# Patient Record
Sex: FEMALE | Race: WHITE | NOT HISPANIC OR LATINO | Employment: OTHER | URBAN - METROPOLITAN AREA
[De-identification: names, ages, dates, MRNs, and addresses within clinical notes are randomized per-mention and may not be internally consistent; named-entity substitution may affect disease eponyms.]

---

## 2023-08-25 ENCOUNTER — TELEPHONE (OUTPATIENT)
Dept: INTERNAL MEDICINE | Facility: CLINIC | Age: 48
End: 2023-08-25
Payer: COMMERCIAL

## 2023-08-25 NOTE — TELEPHONE ENCOUNTER
----- Message from Tasha Castaneda sent at 8/25/2023  3:50 PM CDT -----  Regarding: np appt request  Name of Who is Calling:KOSTA SNYDER [[id number]]          What is the request in detail: pt would like to schedule a np appt for thyroid care           Can the clinic reply by [facility]: no           What Number to Call Back if not in [facility]: [phone] (home)

## 2023-09-01 ENCOUNTER — OFFICE VISIT (OUTPATIENT)
Dept: PRIMARY CARE CLINIC | Facility: CLINIC | Age: 48
End: 2023-09-01
Payer: COMMERCIAL

## 2023-09-01 VITALS
HEART RATE: 97 BPM | HEIGHT: 61 IN | WEIGHT: 123 LBS | TEMPERATURE: 98 F | DIASTOLIC BLOOD PRESSURE: 68 MMHG | BODY MASS INDEX: 23.22 KG/M2 | OXYGEN SATURATION: 98 % | SYSTOLIC BLOOD PRESSURE: 110 MMHG

## 2023-09-01 DIAGNOSIS — E06.3 HASHIMOTO'S THYROIDITIS: Primary | ICD-10-CM

## 2023-09-01 DIAGNOSIS — E78.2 MIXED HYPERLIPIDEMIA: ICD-10-CM

## 2023-09-01 DIAGNOSIS — F41.9 ANXIETY: ICD-10-CM

## 2023-09-01 DIAGNOSIS — F17.200 NEEDS SMOKING CESSATION EDUCATION: ICD-10-CM

## 2023-09-01 PROCEDURE — 99999 PR PBB SHADOW E&M-EST. PATIENT-LVL III: ICD-10-PCS | Mod: PBBFAC,,, | Performed by: FAMILY MEDICINE

## 2023-09-01 PROCEDURE — 99204 OFFICE O/P NEW MOD 45 MIN: CPT | Mod: S$GLB,,, | Performed by: FAMILY MEDICINE

## 2023-09-01 PROCEDURE — 99999 PR PBB SHADOW E&M-EST. PATIENT-LVL III: CPT | Mod: PBBFAC,,, | Performed by: FAMILY MEDICINE

## 2023-09-01 PROCEDURE — 99204 PR OFFICE/OUTPT VISIT, NEW, LEVL IV, 45-59 MIN: ICD-10-PCS | Mod: S$GLB,,, | Performed by: FAMILY MEDICINE

## 2023-09-01 RX ORDER — HYDROCODONE BITARTRATE AND ACETAMINOPHEN 10; 325 MG/1; MG/1
1 TABLET ORAL
COMMUNITY
Start: 2023-08-29 | End: 2023-10-31

## 2023-09-01 RX ORDER — ALPRAZOLAM 0.25 MG/1
0.25 TABLET ORAL
COMMUNITY
Start: 2023-04-12 | End: 2023-09-01 | Stop reason: SDUPTHER

## 2023-09-01 RX ORDER — LIOTHYRONINE SODIUM 5 UG/1
5 TABLET ORAL
COMMUNITY
Start: 2023-06-22 | End: 2023-09-14 | Stop reason: SDUPTHER

## 2023-09-01 RX ORDER — LIDOCAINE 50 MG/G
1 PATCH TOPICAL
COMMUNITY
Start: 2023-03-14

## 2023-09-01 RX ORDER — ALPRAZOLAM 0.25 MG/1
0.25 TABLET ORAL 3 TIMES DAILY PRN
Qty: 30 TABLET | Refills: 2 | Status: SHIPPED | OUTPATIENT
Start: 2023-09-01 | End: 2024-03-14

## 2023-09-01 RX ORDER — ONDANSETRON 8 MG/1
8 TABLET, ORALLY DISINTEGRATING ORAL
COMMUNITY

## 2023-09-01 RX ORDER — ATORVASTATIN CALCIUM 80 MG/1
80 TABLET, FILM COATED ORAL
COMMUNITY
Start: 2023-06-02 | End: 2023-09-01

## 2023-09-01 NOTE — PROGRESS NOTES
"    [facility]       [address] [place] [address]      [phone]     Joanna Neves MD         .      Office Visit  2023  [id number]      SUBJECTIVE     HPI:  Anabella Temple is a 48 y.o. female presents today in clinic for "Establish Care  ."   Patient is here for new patient consult.  She was previously by Dr. Mobley but due to insurance has switched PCPs.  She has a long history of Hashimoto's and elevated lipids.  She is a smoker of many years.  She is still recovering from a right knee surgery but is struggling to get to full recovery and still having pain.  She has some anxiety but only takes medications as needed.  She otherwise feels like she is pretty healthy and other than her knee, stays very active.  Her weight is stable.  She has not had labs drawn, but would like to get these done.  She was taking high dose statin, but due to all the pain she had with her knee surgery, she had been off of it but is not opposed to going back on it.  Does not recall with the last labs were but does state that she does better when her thyroid is a little bit on the overactive side.        ROS   Review of symptoms are negative except as noted.     PAST MEDICAL HISTORY     Past Medical History:   Diagnosis Date    Allergy     Anxiety     GERD (gastroesophageal reflux disease)     [place]'s disease     Hashimoto's thyroiditis     Hyperlipidemia     PVC (premature ventricular contraction)        Past Surgical History:   Procedure Laterality Date    ARTHROSCOPY,KNEE,WITH MENISCUS REPAIR Right 2023     SECTION      COSMETIC SURGERY      HYSTERECTOMY         Social History     Socioeconomic History    Marital status:      Spouse name: Rey    Number of children: 2   Tobacco Use    Smoking status: Every Day     Current packs/day: 0.50     Average packs/day: 0.5 packs/day for 32.7 years (16.3 ttl pk-yrs)     Types: Cigarettes     " "Start date: 1/1/1991     Passive exposure: Past   Substance and Sexual Activity    Alcohol use: Not Currently     Comment: occasionally    Drug use: No    Sexual activity: Yes     Partners: Male     Birth control/protection: Surgical       Allergies as of 09/01/2023    (No Known Allergies)       HOME MEDS     Current Outpatient Medications   Medication Instructions    ALPRAZolam (XANAX) 0.25 mg, Oral, 3 times daily PRN    HYDROcodone-acetaminophen (NORCO)  mg per tablet 1 tablet, Oral    levothyroxine (SYNTHROID) 100 mcg, Daily    LIDOcaine (LIDODERM) 5 % 1 patch    liothyronine (CYTOMEL) 5 mcg, Oral    ondansetron (ZOFRAN-ODT) 8 mg, Oral    simvastatin (ZOCOR) 80 mg, Nightly       The following were updated and reviewed by myself in the chart: medications, past medical history, past surgical history, family history, social history, and allergies.        Objective    OBJECTIVE   /68   Pulse 97   Temp 98.2 °F (36.8 °C)   Ht 5' 1" (1.549 m)   Wt 55.8 kg (123 lb 0.3 oz)   SpO2 98%   BMI 23.24 kg/m²     Wt Readings from Last 2 Encounters:   09/01/23 55.8 kg (123 lb 0.3 oz)   09/05/13 68.9 kg (151 lb 12.8 oz)       BP Readings from Last 2 Encounters:   09/01/23 110/68   09/05/13 110/68          Physical Exam  Vitals and nursing note reviewed.   Constitutional:       Appearance: Normal appearance. She is normal weight.   HENT:      Head: Normocephalic and atraumatic.      Nose: Nose normal.   Eyes:      Extraocular Movements: Extraocular movements intact.      Conjunctiva/sclera: Conjunctivae normal.      Pupils: Pupils are equal, round, and reactive to light.   Cardiovascular:      Rate and Rhythm: Normal rate and regular rhythm.   Pulmonary:      Effort: Pulmonary effort is normal.      Breath sounds: Normal breath sounds.   Abdominal:      General: Abdomen is flat.      Palpations: Abdomen is soft.      Tenderness: There is no abdominal tenderness.   Musculoskeletal:         General: Tenderness " present. No swelling. Normal range of motion.      Cervical back: Normal range of motion.   Lymphadenopathy:      Cervical: No cervical adenopathy.   Skin:     General: Skin is warm.      Findings: No lesion or rash.   Neurological:      General: No focal deficit present.      Mental Status: She is alert. Mental status is at baseline.   Psychiatric:         Mood and Affect: Mood normal.         Behavior: Behavior normal.               LABS      LAB RESULTS, IF AVAILABLE, ARE DISCUSSED WITH PATIENT AND POSTED TO PATIENT PORTAL     ASSESSMENT & PLAN     1. Hashimoto's thyroiditis  Overview:  Alternate remedies that can help with inflammation associated with Hashimoto's include such things such as Selenium 200mg supplementation, adhering to a gluten free diet as well as adding LDN as a non - FDA approved option for therapy. Patient should comply with taking medications as directed. Patient should take thyroid meds on empty stomach and avoid taking with iron, calcium, zinc and fiber.  Potential risks are associated with suppressing TSH (increased arrhythmia and bone loss) as emphasis is on improving patient symptoms. Patient should notify us if any symptoms occur suggesting  overactive thyroid (fast heart rate, anxiety, sleeplessness, and tremors).     Assessment & Plan:  Patient feeling great, we will look at labs and make adjustments if necessary    Orders:  -     TSH; Future; Expected date: 09/01/2023  -     Thyroid Peroxidase Antibody; Future; Expected date: 09/01/2023  -     T4, Free; Future; Expected date: 09/01/2023  -     T3, Free; Future; Expected date: 09/01/2023    2. Mixed hyperlipidemia  Overview:  Reviewed importance of advanced lipid profiles. Advise daily exercise. Diet is recommended. Patients are encouraged to obtain healthy BMI weight. Risks associated with high cholesterol are well established and include but are not limited to heart disease, stroke and peripheral vascular disease. Patient should not  smoke. Goal LDL particle number is <1000 and ApoB <70. Basic LDL is below 100 or below 70 if diabetic. Some non-FDA approved dietary supplements that may be beneficial to patient include but is not limited to high fiber diet at least 30g daily; niacin ER non flush free variety 500mg-1,000mg; Omega-3 fish oil DHA+EPA = 1,000mg twice daily; baby dose aspirin 81mg; co-enzyme q10 500mg to help reduce statin-induced myalgia; red rice yeast 1200mg twice daily; berberine 1000mg-2000mg daily. Patient is encouraged to exercise routinely and adhere to heart healthy diet with Mediterranean diet showing the most consistent data to help with lipid management.      Assessment & Plan:  Has not been taking her statin, Bill we will look at advanced lipids as I question why she would need such a high-intensity statin given her other cardiovascular risk factors other than she is still a smoker.    Orders:  -     CBC Without Differential; Future; Expected date: 09/01/2023  -     Comprehensive Metabolic Panel; Future; Expected date: 09/01/2023  -     Lipoprotein A (LPA); Future; Expected date: 09/01/2023  -     Lipoprotein Analysis, by NMR; Future; Expected date: 09/01/2023  -     Apolipoprotein B; Future; Expected date: 09/01/2023    3. Anxiety  Overview:  It is advised to identify triggers for anxiety and consider the impact of anxious thinking on functioning. Discussed strategies to regulate symptoms and need for compliance with treatment.  Therapy or counseling may be necessary if current regimen is ineffective. Patient will report any worsening or change in anxiety symptoms if necessary.      Assessment & Plan:  Okay to take Xanax as needed.      Other orders  -     ALPRAZolam (XANAX) 0.25 MG tablet; Take 1 tablet (0.25 mg total) by mouth 3 (three) times daily as needed for Anxiety.  Dispense: 30 tablet; Refill: 2          I spent a total of 45 minutes face to face and non-face to face on the date of this visit.This includes time  "preparing to see the patient (eg, review of tests, notes), obtaining and/or reviewing additional history from an independent historian and/or outside medical records, documenting clinical information in the electronic health record, independently interpreting results and/or communicating results to the patient/family/caregiver, or care coordinator.      Disclaimer: Portions of this record may have been created with voice recognition software. Occasional wrong-word or "sound-a-like" substitutions may have occurred due to inherent limitations of voice recognition software. Read the chart carefully and recognize, using context, where substitutions have occurred."    Signed by:  Joanna Neves MD           "

## 2023-09-01 NOTE — ASSESSMENT & PLAN NOTE
Has not been taking her statin, Bill we will look at advanced lipids as I question why she would need such a high-intensity statin given her other cardiovascular risk factors other than she is still a smoker.

## 2023-09-05 ENCOUNTER — PATIENT MESSAGE (OUTPATIENT)
Dept: ADMINISTRATIVE | Facility: HOSPITAL | Age: 48
End: 2023-09-05
Payer: COMMERCIAL

## 2023-09-05 ENCOUNTER — LAB VISIT (OUTPATIENT)
Dept: LAB | Facility: HOSPITAL | Age: 48
End: 2023-09-05
Attending: FAMILY MEDICINE
Payer: COMMERCIAL

## 2023-09-05 DIAGNOSIS — Z12.11 SCREENING FOR COLON CANCER: ICD-10-CM

## 2023-09-05 DIAGNOSIS — E06.3 HASHIMOTO'S THYROIDITIS: ICD-10-CM

## 2023-09-05 DIAGNOSIS — E78.2 MIXED HYPERLIPIDEMIA: ICD-10-CM

## 2023-09-05 LAB
ALBUMIN SERPL BCP-MCNC: 3.5 G/DL (ref 3.5–5.2)
ALP SERPL-CCNC: 74 U/L (ref 55–135)
ALT SERPL W/O P-5'-P-CCNC: 13 U/L (ref 10–44)
ANION GAP SERPL CALC-SCNC: 11 MMOL/L (ref 8–16)
AST SERPL-CCNC: 18 U/L (ref 10–40)
BILIRUB SERPL-MCNC: 0.2 MG/DL (ref 0.1–1)
BUN SERPL-MCNC: 9 MG/DL (ref 6–20)
CALCIUM SERPL-MCNC: 9.6 MG/DL (ref 8.7–10.5)
CHLORIDE SERPL-SCNC: 106 MMOL/L (ref 95–110)
CO2 SERPL-SCNC: 24 MMOL/L (ref 23–29)
CREAT SERPL-MCNC: 0.8 MG/DL (ref 0.5–1.4)
ERYTHROCYTE [DISTWIDTH] IN BLOOD BY AUTOMATED COUNT: 12.4 % (ref 11.5–14.5)
EST. GFR  (NO RACE VARIABLE): >60 ML/MIN/1.73 M^2
GLUCOSE SERPL-MCNC: 95 MG/DL (ref 70–110)
HCT VFR BLD AUTO: 41.6 % (ref 37–48.5)
HGB BLD-MCNC: 13.5 G/DL (ref 12–16)
MCH RBC QN AUTO: 32 PG (ref 27–31)
MCHC RBC AUTO-ENTMCNC: 32.5 G/DL (ref 32–36)
MCV RBC AUTO: 99 FL (ref 82–98)
PLATELET # BLD AUTO: 463 K/UL (ref 150–450)
PMV BLD AUTO: 9.7 FL (ref 9.2–12.9)
POTASSIUM SERPL-SCNC: 4.6 MMOL/L (ref 3.5–5.1)
PROT SERPL-MCNC: 7.6 G/DL (ref 6–8.4)
RBC # BLD AUTO: 4.22 M/UL (ref 4–5.4)
SODIUM SERPL-SCNC: 141 MMOL/L (ref 136–145)
T3FREE SERPL-MCNC: 3.2 PG/ML (ref 2.3–4.2)
T4 FREE SERPL-MCNC: 0.97 NG/DL (ref 0.71–1.51)
THYROPEROXIDASE IGG SERPL-ACNC: 278.8 IU/ML
TSH SERPL DL<=0.005 MIU/L-ACNC: 0.41 UIU/ML (ref 0.4–4)
WBC # BLD AUTO: 11.35 K/UL (ref 3.9–12.7)

## 2023-09-05 PROCEDURE — 80061 LIPID PANEL: CPT | Performed by: FAMILY MEDICINE

## 2023-09-05 PROCEDURE — 83695 ASSAY OF LIPOPROTEIN(A): CPT | Performed by: FAMILY MEDICINE

## 2023-09-05 PROCEDURE — 86376 MICROSOMAL ANTIBODY EACH: CPT | Performed by: FAMILY MEDICINE

## 2023-09-05 PROCEDURE — 84481 FREE ASSAY (FT-3): CPT | Performed by: FAMILY MEDICINE

## 2023-09-05 PROCEDURE — 80053 COMPREHEN METABOLIC PANEL: CPT | Performed by: FAMILY MEDICINE

## 2023-09-05 PROCEDURE — 84439 ASSAY OF FREE THYROXINE: CPT | Performed by: FAMILY MEDICINE

## 2023-09-05 PROCEDURE — 36415 COLL VENOUS BLD VENIPUNCTURE: CPT | Mod: PN | Performed by: FAMILY MEDICINE

## 2023-09-05 PROCEDURE — 85027 COMPLETE CBC AUTOMATED: CPT | Performed by: FAMILY MEDICINE

## 2023-09-05 PROCEDURE — 84443 ASSAY THYROID STIM HORMONE: CPT | Performed by: FAMILY MEDICINE

## 2023-09-05 PROCEDURE — 82172 ASSAY OF APOLIPOPROTEIN: CPT | Performed by: FAMILY MEDICINE

## 2023-09-07 LAB — APO B SERPL-MCNC: 155 MG/DL

## 2023-09-08 LAB — LPA SERPL-MCNC: 185 MG/DL (ref 0–30)

## 2023-09-09 LAB
CHOLEST SERPL-MCNC: 282 MG/DL
HDL SERPL QN: 8.9 NM
HDL SERPL-SCNC: 31.4 UMOL/L
HDLC SERPL-MCNC: 56 MG/DL (ref 40–59)
HLD.LARGE SERPL-SCNC: 5.5 UMOL/L
LDL SERPL QN: 21.2 NM
LDL SERPL-SCNC: 2132 NMOL/L
LDL SMALL SERPL-SCNC: 832 NMOL/L
LDLC SERPL CALC-MCNC: 201 MG/DL
PATHOLOGY STUDY: ABNORMAL
TRIGL SERPL-MCNC: 124 MG/DL (ref 30–149)
VLDL LARGE SERPL-SCNC: <1.5 NMOL/L
VLDL SERPL QN: 41.7 NM

## 2023-09-14 ENCOUNTER — PATIENT MESSAGE (OUTPATIENT)
Dept: PRIMARY CARE CLINIC | Facility: CLINIC | Age: 48
End: 2023-09-14
Payer: COMMERCIAL

## 2023-09-14 DIAGNOSIS — E06.3 HASHIMOTO'S THYROIDITIS: Primary | ICD-10-CM

## 2023-09-14 RX ORDER — LIOTHYRONINE SODIUM 5 UG/1
5 TABLET ORAL DAILY
Qty: 90 TABLET | Refills: 1 | Status: SHIPPED | OUTPATIENT
Start: 2023-09-14 | End: 2023-09-21 | Stop reason: SDUPTHER

## 2023-09-20 DIAGNOSIS — E06.3 HASHIMOTO'S THYROIDITIS: ICD-10-CM

## 2023-09-20 RX ORDER — LIOTHYRONINE SODIUM 5 UG/1
5 TABLET ORAL DAILY
Qty: 90 TABLET | Refills: 1 | OUTPATIENT
Start: 2023-09-20 | End: 2023-12-19

## 2023-09-21 RX ORDER — LIOTHYRONINE SODIUM 5 UG/1
5 TABLET ORAL DAILY
Qty: 90 TABLET | Refills: 0 | Status: SHIPPED | OUTPATIENT
Start: 2023-09-21 | End: 2023-12-19

## 2023-09-21 NOTE — TELEPHONE ENCOUNTER
----- Message from Lexi Mayo sent at 9/21/2023 11:07 AM CDT -----  Contact: Anabella  .Type:  RX Refill Request    Who Called: Anabella   Refill or New Rx:refill   RX Name and Strength:liothyronine (CYTOMEL) 5 MCG Tab  How is the patient currently taking it? (ex. 1XDay):as prescribed   Is this a 30 day or 90 day RX:90 days   Preferred Pharmacy with phone number:.  [facility]/pharm[facility] #[id number] - [place] - [address] [facility]  [address] [place] [address]  Phone: [phone] Fax: [phone]  Local or Mail Order:local   Ordering Provider:Dr. Neves   Would the patient rather a call back or a response via [facility]? Call   Best Call Back Number:..[phone]   Additional Information: Pt requesting medication

## 2023-10-06 ENCOUNTER — PATIENT MESSAGE (OUTPATIENT)
Dept: ADMINISTRATIVE | Facility: HOSPITAL | Age: 48
End: 2023-10-06
Payer: COMMERCIAL

## 2023-10-31 ENCOUNTER — OFFICE VISIT (OUTPATIENT)
Dept: PRIMARY CARE CLINIC | Facility: CLINIC | Age: 48
End: 2023-10-31
Payer: COMMERCIAL

## 2023-10-31 VITALS
RESPIRATION RATE: 16 BRPM | SYSTOLIC BLOOD PRESSURE: 128 MMHG | OXYGEN SATURATION: 97 % | HEIGHT: 61 IN | HEART RATE: 91 BPM | DIASTOLIC BLOOD PRESSURE: 68 MMHG | BODY MASS INDEX: 23.19 KG/M2 | TEMPERATURE: 98 F | WEIGHT: 122.81 LBS

## 2023-10-31 DIAGNOSIS — J01.00 ACUTE NON-RECURRENT MAXILLARY SINUSITIS: Primary | ICD-10-CM

## 2023-10-31 PROCEDURE — 99213 OFFICE O/P EST LOW 20 MIN: CPT | Mod: S$GLB,,, | Performed by: FAMILY MEDICINE

## 2023-10-31 PROCEDURE — 99213 PR OFFICE/OUTPT VISIT, EST, LEVL III, 20-29 MIN: ICD-10-PCS | Mod: S$GLB,,, | Performed by: FAMILY MEDICINE

## 2023-10-31 PROCEDURE — 99999 PR PBB SHADOW E&M-EST. PATIENT-LVL III: ICD-10-PCS | Mod: PBBFAC,,, | Performed by: FAMILY MEDICINE

## 2023-10-31 PROCEDURE — 99999 PR PBB SHADOW E&M-EST. PATIENT-LVL III: CPT | Mod: PBBFAC,,, | Performed by: FAMILY MEDICINE

## 2023-10-31 RX ORDER — MUPIROCIN 20 MG/G
OINTMENT TOPICAL 3 TIMES DAILY
Qty: 22 G | Refills: 0 | Status: SHIPPED | OUTPATIENT
Start: 2023-10-31

## 2023-10-31 RX ORDER — ASPIRIN 325 MG
325 TABLET ORAL EVERY 6 HOURS PRN
COMMUNITY

## 2023-10-31 RX ORDER — METHYLPREDNISOLONE 4 MG/1
TABLET ORAL
Qty: 21 TABLET | Refills: 0 | Status: SHIPPED | OUTPATIENT
Start: 2023-10-31

## 2023-10-31 RX ORDER — AZITHROMYCIN 250 MG/1
TABLET, FILM COATED ORAL
Qty: 6 TABLET | Refills: 0 | Status: SHIPPED | OUTPATIENT
Start: 2023-10-31 | End: 2023-11-05

## 2023-10-31 NOTE — PROGRESS NOTES
"    [facility]       [address] [place] [address]      [phone]     Joanna Neves MD         .      Office Visit  10/31/2023  [id number]      SUBJECTIVE     HPI:  Anabella Temple is a 48 y.o. female presents today in clinic for "Sinus Problem (For 4 days)  ."   Subjective:     Anabella Temple is a 48 y.o. female who presents for evaluation of possible sinusitis. Symptoms include congestion, facial pain, nasal congestion, and sinus pressure. Onset of symptoms was 7 days ago, and has been gradually worsening since that time. Treatment to date: none.    Review of Systems  Pertinent items are noted in HPI.              Sinus Problem        ROS   Review of symptoms are negative except as noted.     PAST MEDICAL HISTORY     Past Medical History:   Diagnosis Date    Allergy     Anxiety     GERD (gastroesophageal reflux disease)     [place]'s disease     Hashimoto's thyroiditis     Hyperlipidemia     PVC (premature ventricular contraction)        Past Surgical History:   Procedure Laterality Date    ARTHROSCOPY,KNEE,WITH MENISCUS REPAIR Right 2023     SECTION      COSMETIC SURGERY      HYSTERECTOMY         Social History     Socioeconomic History    Marital status:      Spouse name: Rey    Number of children: 2   Tobacco Use    Smoking status: Every Day     Current packs/day: 0.50     Average packs/day: 0.5 packs/day for 32.8 years (16.4 ttl pk-yrs)     Types: Cigarettes     Start date: 1991     Passive exposure: Past   Substance and Sexual Activity    Alcohol use: Not Currently     Comment: occasionally    Drug use: No    Sexual activity: Yes     Partners: Male     Birth control/protection: Surgical       Allergies as of 10/31/2023    (No Known Allergies)       HOME MEDS     Current Outpatient Medications   Medication Instructions    ALPRAZolam (XANAX) 0.25 mg, Oral, 3 times daily PRN    aspirin 325 mg, Oral, Every 6 " "hours PRN    azithromycin (Z-PAO) 250 MG tablet Take 2 tablets by mouth on day 1; Take 1 tablet by mouth on days 2-5<BR>    levothyroxine (SYNTHROID) 100 mcg, Daily    LIDOcaine (LIDODERM) 5 % 1 patch    liothyronine (CYTOMEL) 5 mcg, Oral, Daily    methylPREDNISolone (MEDROL DOSEPACK) 4 mg tablet follow package directions    mupirocin (BACTROBAN) 2 % ointment Topical (Top), 3 times daily    ondansetron (ZOFRAN-ODT) 8 mg, Oral       The following were updated and reviewed by myself in the chart: medications, past medical history, past surgical history, family history, social history, and allergies.        Objective    OBJECTIVE   /68   Pulse 91   Temp 98.1 °F (36.7 °C) (Oral)   Resp 16   Ht 5' 1" (1.549 m)   Wt 55.7 kg (122 lb 12.7 oz)   SpO2 97%   BMI 23.20 kg/m²     Wt Readings from Last 2 Encounters:   10/31/23 55.7 kg (122 lb 12.7 oz)   09/01/23 55.8 kg (123 lb 0.3 oz)       BP Readings from Last 2 Encounters:   10/31/23 128/68   09/01/23 110/68          Physical Exam  Constitutional:       Comments: Fatigued appearing   HENT:      Nose: Mucosal edema, congestion and rhinorrhea present.      Right Turbinates: Enlarged and swollen.      Left Turbinates: Enlarged and swollen.      Left Sinus: Maxillary sinus tenderness present.      Mouth/Throat:      Lips: Pink.      Mouth: Mucous membranes are moist.      Pharynx: Oropharynx is clear.               LABS      LAB RESULTS, IF AVAILABLE, ARE DISCUSSED WITH PATIENT AND POSTED TO PATIENT PORTAL     ASSESSMENT & PLAN     1. Acute non-recurrent maxillary sinusitis  -     mupirocin (BACTROBAN) 2 % ointment; Apply topically 3 (three) times daily.  Dispense: 22 g; Refill: 0  -     azithromycin (Z-PAO) 250 MG tablet; Take 2 tablets by mouth on day 1; Take 1 tablet by mouth on days 2-5  Dispense: 6 tablet; Refill: 0  -     methylPREDNISolone (MEDROL DOSEPACK) 4 mg tablet; follow package directions  Dispense: 21 tablet; Refill: 0    Discussed diagnosis and " "treatment of URI.  Suggested symptomatic OTC remedies.  Nasal saline spray for congestion.  Follow up as needed.  We will treat with Z-Ian as this has previously been effective and if not better okay to start the Medrol Dosepak if cleared by her surgeon as she is about three weeks postop from partial knee procedure.  Given since of foul smell in the right nostril, okay to put mupirocin in each nasal cavity    I spent a total of 20 minutes face to face and non-face to face on the date of this visit.This includes time preparing to see the patient (eg, review of tests, notes), obtaining and/or reviewing additional history from an independent historian and/or outside medical records, documenting clinical information in the electronic health record, independently interpreting results and/or communicating results to the patient/family/caregiver, or care coordinator.      Disclaimer: Portions of this record may have been created with voice recognition software. Occasional wrong-word or "sound-a-like" substitutions may have occurred due to inherent limitations of voice recognition software. Read the chart carefully and recognize, using context, where substitutions have occurred."    Signed by:  Joanna Neves MD           "

## 2023-12-17 DIAGNOSIS — E06.3 HASHIMOTO'S THYROIDITIS: ICD-10-CM

## 2023-12-19 RX ORDER — LIOTHYRONINE SODIUM 5 UG/1
5 TABLET ORAL
Qty: 90 TABLET | Refills: 0 | Status: SHIPPED | OUTPATIENT
Start: 2023-12-19 | End: 2024-03-05

## 2024-02-05 PROBLEM — J01.00 ACUTE NON-RECURRENT MAXILLARY SINUSITIS: Status: RESOLVED | Noted: 2023-10-31 | Resolved: 2024-02-05

## 2024-03-05 DIAGNOSIS — E06.3 HASHIMOTO'S THYROIDITIS: ICD-10-CM

## 2024-03-05 RX ORDER — LIOTHYRONINE SODIUM 5 UG/1
5 TABLET ORAL
Qty: 90 TABLET | Refills: 0 | Status: SHIPPED | OUTPATIENT
Start: 2024-03-05 | End: 2024-05-30 | Stop reason: SDUPTHER

## 2024-03-14 RX ORDER — ALPRAZOLAM 0.25 MG/1
0.25 TABLET ORAL 3 TIMES DAILY
Qty: 30 TABLET | Refills: 0 | Status: SHIPPED | OUTPATIENT
Start: 2024-03-14

## 2024-05-30 DIAGNOSIS — E06.3 HASHIMOTO'S THYROIDITIS: ICD-10-CM

## 2024-05-30 RX ORDER — LIOTHYRONINE SODIUM 5 UG/1
5 TABLET ORAL
Qty: 90 TABLET | Refills: 0 | Status: SHIPPED | OUTPATIENT
Start: 2024-05-30

## 2024-07-24 ENCOUNTER — OFFICE VISIT (OUTPATIENT)
Dept: INTERNAL MEDICINE | Facility: CLINIC | Age: 49
End: 2024-07-24
Payer: COMMERCIAL

## 2024-07-24 VITALS
OXYGEN SATURATION: 99 % | TEMPERATURE: 96 F | DIASTOLIC BLOOD PRESSURE: 76 MMHG | HEIGHT: 61 IN | HEART RATE: 86 BPM | SYSTOLIC BLOOD PRESSURE: 134 MMHG | BODY MASS INDEX: 24.26 KG/M2 | WEIGHT: 128.5 LBS

## 2024-07-24 DIAGNOSIS — F41.9 ANXIETY: Primary | ICD-10-CM

## 2024-07-24 DIAGNOSIS — E06.3 HASHIMOTO'S THYROIDITIS: ICD-10-CM

## 2024-07-24 PROCEDURE — 99214 OFFICE O/P EST MOD 30 MIN: CPT | Mod: S$GLB,,, | Performed by: NURSE PRACTITIONER

## 2024-07-24 PROCEDURE — 99999 PR PBB SHADOW E&M-EST. PATIENT-LVL III: CPT | Mod: PBBFAC,,, | Performed by: NURSE PRACTITIONER

## 2024-07-24 RX ORDER — ALPRAZOLAM 0.25 MG/1
0.25 TABLET ORAL 2 TIMES DAILY PRN
Qty: 30 TABLET | Refills: 0 | Status: SHIPPED | OUTPATIENT
Start: 2024-07-24

## 2024-07-24 RX ORDER — LEVOTHYROXINE SODIUM 100 UG/1
100 TABLET ORAL DAILY
Qty: 90 TABLET | Refills: 1 | Status: SHIPPED | OUTPATIENT
Start: 2024-07-24

## 2024-07-24 RX ORDER — LIOTHYRONINE SODIUM 5 UG/1
5 TABLET ORAL DAILY
Qty: 90 TABLET | Refills: 1 | Status: SHIPPED | OUTPATIENT
Start: 2024-07-24

## 2024-07-24 NOTE — PROGRESS NOTES
"Subjective:       Patient ID: Anabella Temple is a 48 y.o. female.    Chief Complaint: Medication Refill    Pt presents to clinic today for medication refills  New to me, previous PCP Dr. Neves  She is currently trying to to est with new PCP  Hx of hashimoto's thyroiditis and anxiety  Overall doing well from chronic conditions  Needing refills today         /76   Pulse 86   Temp 96.2 °F (35.7 °C) (Tympanic)   Ht 5' 1" (1.549 m)   Wt 58.3 kg (128 lb 8.5 oz)   SpO2 99%   BMI 24.29 kg/m²     Review of Systems   Constitutional:  Negative for activity change, appetite change, chills, diaphoresis, fatigue, fever and unexpected weight change.   HENT: Negative.     Respiratory:  Negative for cough and shortness of breath.    Cardiovascular:  Negative for chest pain, palpitations and leg swelling.   Gastrointestinal: Negative.    Genitourinary: Negative.    Musculoskeletal: Negative.    Skin:  Negative for color change, pallor, rash and wound.   Allergic/Immunologic: Negative for immunocompromised state.   Neurological: Negative.  Negative for dizziness and facial asymmetry.   Hematological:  Negative for adenopathy. Does not bruise/bleed easily.   Psychiatric/Behavioral:  Negative for agitation, behavioral problems and confusion.        Objective:      Physical Exam  Vitals and nursing note reviewed.   Constitutional:       General: She is not in acute distress.     Appearance: Normal appearance. She is well-developed. She is not diaphoretic.   HENT:      Head: Normocephalic and atraumatic.   Cardiovascular:      Rate and Rhythm: Normal rate and regular rhythm.      Heart sounds: Normal heart sounds. No murmur heard.  Pulmonary:      Effort: Pulmonary effort is normal. No respiratory distress.      Breath sounds: Normal breath sounds.   Musculoskeletal:         General: Normal range of motion.   Skin:     General: Skin is warm and dry.      Findings: No rash.   Neurological:      Mental Status: She is alert. "   Psychiatric:         Mood and Affect: Mood normal.         Behavior: Behavior normal. Behavior is cooperative.         Thought Content: Thought content normal.         Judgment: Judgment normal.         Assessment:       1. Anxiety    2. Hashimoto's thyroiditis    3. BMI 24.0-24.9, adult        Plan:       Anabella was seen today for medication refill.    Diagnoses and all orders for this visit:    Anxiety  -     ALPRAZolam (XANAX) 0.25 MG tablet; Take 1 tablet (0.25 mg total) by mouth 2 (two) times daily as needed for Anxiety.  - Chronic, stable. Will provide 1 refill-- discussed with pt this is not a prescription I will continue. Advised pt to find PCP asap  Hashimoto's thyroiditis  -     levothyroxine (SYNTHROID) 100 MCG tablet; Take 1 tablet (100 mcg total) by mouth once daily.  -     liothyronine (CYTOMEL) 5 MCG Tab; Take 1 tablet (5 mcg total) by mouth once daily.  - Chronic, stable on current meds. Continue     BMI 24.0-24.9, adult    Chronic conditions stable  Refills provided  Names for new PCP provided. Pt to schedule   Follow up for worsening or no improvement in symptoms and PRN.

## 2024-09-23 ENCOUNTER — OFFICE VISIT (OUTPATIENT)
Dept: PRIMARY CARE CLINIC | Facility: CLINIC | Age: 49
End: 2024-09-23
Payer: COMMERCIAL

## 2024-09-23 VITALS
HEART RATE: 84 BPM | DIASTOLIC BLOOD PRESSURE: 64 MMHG | RESPIRATION RATE: 16 BRPM | BODY MASS INDEX: 24.55 KG/M2 | OXYGEN SATURATION: 98 % | SYSTOLIC BLOOD PRESSURE: 120 MMHG | TEMPERATURE: 98 F | HEIGHT: 61 IN | WEIGHT: 130.06 LBS

## 2024-09-23 DIAGNOSIS — Z12.11 COLON CANCER SCREENING: ICD-10-CM

## 2024-09-23 DIAGNOSIS — E78.2 MIXED HYPERLIPIDEMIA: ICD-10-CM

## 2024-09-23 DIAGNOSIS — F41.9 ANXIETY: ICD-10-CM

## 2024-09-23 DIAGNOSIS — C43.9 MALIGNANT MELANOMA, UNSPECIFIED SITE: ICD-10-CM

## 2024-09-23 DIAGNOSIS — Z12.31 ENCOUNTER FOR SCREENING MAMMOGRAM FOR MALIGNANT NEOPLASM OF BREAST: ICD-10-CM

## 2024-09-23 DIAGNOSIS — E06.3 HASHIMOTO'S THYROIDITIS: ICD-10-CM

## 2024-09-23 DIAGNOSIS — Z72.0 TOBACCO ABUSE: ICD-10-CM

## 2024-09-23 DIAGNOSIS — Z00.00 ANNUAL PHYSICAL EXAM: Primary | ICD-10-CM

## 2024-09-23 PROCEDURE — 99999 PR PBB SHADOW E&M-EST. PATIENT-LVL V: CPT | Mod: PBBFAC,,, | Performed by: PHYSICIAN ASSISTANT

## 2024-09-23 PROCEDURE — 99215 OFFICE O/P EST HI 40 MIN: CPT | Mod: S$GLB,,, | Performed by: PHYSICIAN ASSISTANT

## 2024-09-23 RX ORDER — LIOTHYRONINE SODIUM 5 UG/1
5 TABLET ORAL DAILY
Qty: 90 TABLET | Refills: 1 | Status: SHIPPED | OUTPATIENT
Start: 2024-09-23 | End: 2024-09-25 | Stop reason: SDUPTHER

## 2024-09-23 RX ORDER — TRETINOIN 1 MG/G
CREAM TOPICAL
COMMUNITY
Start: 2024-09-18

## 2024-09-23 RX ORDER — MINOXIDIL 2.5 MG/1
1.25 TABLET ORAL NIGHTLY
COMMUNITY
Start: 2024-08-15

## 2024-09-23 NOTE — PROGRESS NOTES
"    [facility]       [address] [place] [address]      Phone: [phone]      Fax: [phone]    Maggy Horn PA-C                Office Visit  09/24/2024        Subjective      HPI:  Anabella Temple is a 49 y.o. female presents today in clinic for "Establish Care  ."     HPI  Patient is a 49 old female who presents to clinic to reestablish care.  Previously was following with Dr. Neves.    Patient reports that overall, she has been doing fairly well.  Denies any acute complaints today.    Reports that recently she saw Dr. Constantino for skin issues and was found to have malignant melanoma of right upper arm.  Patient reports that he plans to follow her every 3 months for ongoing monitoring.    Patient has a longstanding smoking history.  Smoking half a pack per day.  States that she has cut back.  She is interested in the smoking cessation program.    Regarding hyperlipidemia - likely has familial hyperlipidemia.  Labs from 09/2023 - lipoprotein a was high at 185.  LDL is 201.  It was recommended that patient restart high dose statin.  Patient states that she has not resumed taking the statin.  States that it caused her to have severe myalgias and she had been prescribed cholesterol medication for many years.  She does not want to resume taking any medications for cholesterol at this time.  Patient understands the increased risk of cardiovascular disease.  She also has a history of long-term smoking.   Denies any known family history of premature ASCVD.  Patient has never had any cardiac screening and politely declines any screening at this time.    History of Hashimoto's thyroiditis - currently on Synthroid 100 mcg and Cytomel 5 mcg.  Denies any symptoms.    Anxiety - take Xanax as needed for anxiety/panic attacks.      Health Maintenance:   Mammogram - due now   Colorectal cancer screening - due now.  Denies any family history of colorectal cancer.  " Denies any GI complaints.  Pap - history of hysterectomy      The following were updated and reviewed by myself in the chart: medications, past medical history, past surgical history, family history, social history, and allergies.     Medications:  Current Outpatient Medications on File Prior to Visit   Medication Sig Dispense Refill    ALPRAZolam (XANAX) 0.25 MG tablet Take 1 tablet (0.25 mg total) by mouth 2 (two) times daily as needed for Anxiety. 30 tablet 0    levothyroxine (SYNTHROID) 100 MCG tablet Take 1 tablet (100 mcg total) by mouth once daily. 90 tablet 1    minoxidiL (LONITEN) 2.5 MG tablet Take 1.25 mg by mouth every evening.      tretinoin (RETIN-A) 0.1 % cream SMARTSIG:Topical 3 Times a Week PRN       No current facility-administered medications on file prior to visit.        PMHx:  Past Medical History:   Diagnosis Date    Allergy     Anxiety     GERD (gastroesophageal reflux disease)     Joe's disease     Hashimoto's thyroiditis     Hyperlipidemia     PVC (premature ventricular contraction)       Patient Active Problem List    Diagnosis Date Noted    Hashimoto's thyroiditis     Hyperlipidemia     GERD (gastroesophageal reflux disease)     Anxiety         PSHx:  Past Surgical History:   Procedure Laterality Date    ARTHROSCOPY,KNEE,WITH MENISCUS REPAIR Right 2023     SECTION      COSMETIC SURGERY      HYSTERECTOMY      KNEE ARTHROSCOPY W/ PARTIAL MEDIAL MENISCECTOMY Right 10/2023        FHx:  Family History   Problem Relation Name Age of Onset    Cancer Mother          Metastatic breast cancer    Depression Mother      Hyperlipidemia Mother      Hypertension Mother      Cancer Father          Bile duct cancer    Hearing loss Father      Hyperlipidemia Father      Diabetes Sister      Miscarriages / Stillbirths Sister      Alcohol abuse Brother          Social:  Social History     Socioeconomic History    Marital status:      Spouse name: Rey    Number of children: 2  "  Tobacco Use    Smoking status: Every Day     Current packs/day: 0.50     Average packs/day: 0.5 packs/day for 33.7 years (16.9 ttl pk-yrs)     Types: Cigarettes     Start date: 1/1/1991     Passive exposure: Past   Substance and Sexual Activity    Alcohol use: Not Currently     Comment: occasionally    Drug use: No    Sexual activity: Yes     Partners: Male     Birth control/protection: Surgical        Allergies:  Review of patient's allergies indicates:  No Known Allergies     ROS:  Review of Systems   Constitutional:  Negative for activity change, appetite change and unexpected weight change.   HENT:  Negative for trouble swallowing and voice change.    Eyes:  Negative for visual disturbance.   Respiratory:  Negative for shortness of breath.    Cardiovascular:  Negative for chest pain.   Gastrointestinal:  Negative for abdominal pain, constipation and diarrhea.   Endocrine: Negative for polydipsia, polyphagia and polyuria.   Genitourinary:  Negative for decreased urine volume and difficulty urinating.   Musculoskeletal:  Negative for arthralgias and myalgias.   Skin:  Negative for rash.   Neurological:  Negative for dizziness, light-headedness and headaches.   Psychiatric/Behavioral:  Negative for dysphoric mood. The patient is not nervous/anxious.           Objective      /64 (BP Location: Right arm, Patient Position: Sitting)   Pulse 84   Temp 98.1 °F (36.7 °C) (Oral)   Resp 16   Ht 5' 1" (1.549 m)   Wt 59 kg (130 lb 1.1 oz)   SpO2 98%   BMI 24.58 kg/m²   Ht Readings from Last 3 Encounters:   09/23/24 5' 1" (1.549 m)   07/24/24 5' 1" (1.549 m)   10/31/23 5' 1" (1.549 m)     Wt Readings from Last 3 Encounters:   09/23/24 59 kg (130 lb 1.1 oz)   07/24/24 58.3 kg (128 lb 8.5 oz)   10/31/23 55.7 kg (122 lb 12.7 oz)       PHYSICAL EXAM:  Physical Exam  Vitals and nursing note reviewed.   Constitutional:       General: She is not in acute distress.     Appearance: Normal appearance. She is not " ill-appearing.   HENT:      Head: Normocephalic and atraumatic.      Nose: Nose normal.      Mouth/Throat:      Mouth: Mucous membranes are moist.      Pharynx: Oropharynx is clear.   Eyes:      Extraocular Movements: Extraocular movements intact.      Pupils: Pupils are equal, round, and reactive to light.   Cardiovascular:      Rate and Rhythm: Normal rate and regular rhythm.      Pulses: Normal pulses.      Heart sounds: Normal heart sounds.   Pulmonary:      Effort: Pulmonary effort is normal. No respiratory distress.      Breath sounds: Normal breath sounds.   Abdominal:      General: Abdomen is flat. Bowel sounds are normal.      Tenderness: There is no abdominal tenderness.   Musculoskeletal:         General: No deformity or signs of injury. Normal range of motion.      Cervical back: Normal range of motion.   Skin:     General: Skin is warm and dry.      Findings: No rash.   Neurological:      General: No focal deficit present.      Mental Status: She is alert.   Psychiatric:         Mood and Affect: Mood normal.              LABS / IMAGING:  No results found for this or any previous visit (from the past 4368 hour(s)).      Assessment    1. Annual physical exam    2. Hashimoto's thyroiditis    3. Mixed hyperlipidemia    4. Anxiety    5. Malignant melanoma, unspecified site    6. Encounter for screening mammogram for malignant neoplasm of breast    7. Colon cancer screening    8. Tobacco abuse          Plan    Anabella was seen today for establish care.    Diagnoses and all orders for this visit:    Annual physical exam    Hashimoto's thyroiditis  -     CBC Auto Differential; Future  -     Comprehensive Metabolic Panel; Future  -     TSH; Future  -     T4, FREE; Future  -     T3, FREE; Future  -     liothyronine (CYTOMEL) 5 MCG Tab; Take 1 tablet (5 mcg total) by mouth once daily.    Mixed hyperlipidemia  -     CBC Auto Differential; Future  -     Comprehensive Metabolic Panel; Future  -     Lipid Panel;  Future  - Patient previously on statins and other cholesterol lowering agents. Could not tolerate statins. She was advised to resume statin last time she had labs, but never started back. She is aware of the risk of uncontrolled HLD and increased risk of premature ASCVD. She may consider other options in the future for treatment and screening.   - Also discussed cardiology referral, she would like to hold off for now   - Counseled on smoking cessation     Anxiety  - Stable. Takes Xanax PRN     Encounter for screening mammogram for malignant neoplasm of breast  -     Mammo Digital Screening Bilat w/ Kamran; Future    Colon cancer screening  -     Cologuard Screening (Multitarget Stool DNA); Future  -     Cologuard Screening (Multitarget Stool DNA)    Tobacco abuse  -     Ambulatory referral/consult to Smoking Cessation Program; Future    Malignant melanoma, unspecified site  - continue following with Dermatology           FOLLOW-UP:  Follow up in about 6 months (around 3/23/2025).    I spent a total of 55 minutes face to face and non-face to face on the date of this visit.This includes time preparing to see the patient (eg, review of tests, notes), obtaining and/or reviewing additional history from an independent historian and/or outside medical records, documenting clinical information in the electronic health record, independently interpreting results and/or communicating results to the patient/family/caregiver, or care coordinator.  Visit today included increased complexity associated with the care of the episodic problem addressed and managing the longitudinal care of the patient due to the serious and/or complex managed problem(s).    Signed by:        Maggy Horn PA-C

## 2024-09-23 NOTE — PATIENT INSTRUCTIONS
Regarding high cholesterol - I do recommend medication treatment as this is likely familial hyperlipidemia.  Please let me know if you change your mind and would like to pursue further treatments.  We can see what your lipid panel looks like currently and decide next steps.  We also can refer you to cardiology for further treatment.    Continue to eat a healthy diet.  Be careful with portion sizes.  Includes lots of fresh fruits, vegetables, whole grains, lean proteins.  See info below.    Keep hydrated.  Be sure to drink at least 8-10, 8 oz, glasses of water every day.    Stay active.  Try to do some sort of physical activity every day.  Nothing outrageous, just try walking for 10-15 minutes each day.

## 2024-09-24 ENCOUNTER — LAB VISIT (OUTPATIENT)
Dept: LAB | Facility: HOSPITAL | Age: 49
End: 2024-09-24
Attending: PHYSICIAN ASSISTANT
Payer: COMMERCIAL

## 2024-09-24 DIAGNOSIS — E78.2 MIXED HYPERLIPIDEMIA: ICD-10-CM

## 2024-09-24 DIAGNOSIS — E06.3 HASHIMOTO'S THYROIDITIS: ICD-10-CM

## 2024-09-24 LAB
ALBUMIN SERPL BCP-MCNC: 3.8 G/DL (ref 3.5–5.2)
ALP SERPL-CCNC: 56 U/L (ref 55–135)
ALT SERPL W/O P-5'-P-CCNC: 10 U/L (ref 10–44)
ANION GAP SERPL CALC-SCNC: 9 MMOL/L (ref 8–16)
AST SERPL-CCNC: 16 U/L (ref 10–40)
BASOPHILS # BLD AUTO: 0.08 K/UL (ref 0–0.2)
BASOPHILS NFR BLD: 0.7 % (ref 0–1.9)
BILIRUB SERPL-MCNC: 0.3 MG/DL (ref 0.1–1)
BUN SERPL-MCNC: 9 MG/DL (ref 6–20)
CALCIUM SERPL-MCNC: 9.3 MG/DL (ref 8.7–10.5)
CHLORIDE SERPL-SCNC: 108 MMOL/L (ref 95–110)
CHOLEST SERPL-MCNC: 302 MG/DL (ref 120–199)
CHOLEST/HDLC SERPL: 5.4 {RATIO} (ref 2–5)
CO2 SERPL-SCNC: 24 MMOL/L (ref 23–29)
CREAT SERPL-MCNC: 0.7 MG/DL (ref 0.5–1.4)
DIFFERENTIAL METHOD BLD: ABNORMAL
EOSINOPHIL # BLD AUTO: 0.2 K/UL (ref 0–0.5)
EOSINOPHIL NFR BLD: 1.7 % (ref 0–8)
ERYTHROCYTE [DISTWIDTH] IN BLOOD BY AUTOMATED COUNT: 13.1 % (ref 11.5–14.5)
EST. GFR  (NO RACE VARIABLE): >60 ML/MIN/1.73 M^2
GLUCOSE SERPL-MCNC: 88 MG/DL (ref 70–110)
HCT VFR BLD AUTO: 40.8 % (ref 37–48.5)
HDLC SERPL-MCNC: 56 MG/DL (ref 40–75)
HDLC SERPL: 18.5 % (ref 20–50)
HGB BLD-MCNC: 13.4 G/DL (ref 12–16)
IMM GRANULOCYTES # BLD AUTO: 0.03 K/UL (ref 0–0.04)
IMM GRANULOCYTES NFR BLD AUTO: 0.3 % (ref 0–0.5)
LDLC SERPL CALC-MCNC: 215 MG/DL (ref 63–159)
LYMPHOCYTES # BLD AUTO: 3.8 K/UL (ref 1–4.8)
LYMPHOCYTES NFR BLD: 33.5 % (ref 18–48)
MCH RBC QN AUTO: 33.2 PG (ref 27–31)
MCHC RBC AUTO-ENTMCNC: 32.8 G/DL (ref 32–36)
MCV RBC AUTO: 101 FL (ref 82–98)
MONOCYTES # BLD AUTO: 0.7 K/UL (ref 0.3–1)
MONOCYTES NFR BLD: 5.8 % (ref 4–15)
NEUTROPHILS # BLD AUTO: 6.6 K/UL (ref 1.8–7.7)
NEUTROPHILS NFR BLD: 58 % (ref 38–73)
NONHDLC SERPL-MCNC: 246 MG/DL
NRBC BLD-RTO: 0 /100 WBC
PLATELET # BLD AUTO: 420 K/UL (ref 150–450)
PMV BLD AUTO: 9.5 FL (ref 9.2–12.9)
POTASSIUM SERPL-SCNC: 4.6 MMOL/L (ref 3.5–5.1)
PROT SERPL-MCNC: 7.1 G/DL (ref 6–8.4)
RBC # BLD AUTO: 4.04 M/UL (ref 4–5.4)
SODIUM SERPL-SCNC: 141 MMOL/L (ref 136–145)
T3FREE SERPL-MCNC: 2.4 PG/ML (ref 2.3–4.2)
T4 FREE SERPL-MCNC: 1.09 NG/DL (ref 0.71–1.51)
TRIGL SERPL-MCNC: 155 MG/DL (ref 30–150)
TSH SERPL DL<=0.005 MIU/L-ACNC: 0.14 UIU/ML (ref 0.4–4)
WBC # BLD AUTO: 11.36 K/UL (ref 3.9–12.7)

## 2024-09-24 PROCEDURE — 85025 COMPLETE CBC W/AUTO DIFF WBC: CPT | Performed by: PHYSICIAN ASSISTANT

## 2024-09-24 PROCEDURE — 80061 LIPID PANEL: CPT | Performed by: PHYSICIAN ASSISTANT

## 2024-09-24 PROCEDURE — 80053 COMPREHEN METABOLIC PANEL: CPT | Performed by: PHYSICIAN ASSISTANT

## 2024-09-24 PROCEDURE — 36415 COLL VENOUS BLD VENIPUNCTURE: CPT | Mod: PN | Performed by: PHYSICIAN ASSISTANT

## 2024-09-24 PROCEDURE — 84481 FREE ASSAY (FT-3): CPT | Performed by: PHYSICIAN ASSISTANT

## 2024-09-24 PROCEDURE — 84439 ASSAY OF FREE THYROXINE: CPT | Performed by: PHYSICIAN ASSISTANT

## 2024-09-24 PROCEDURE — 84443 ASSAY THYROID STIM HORMONE: CPT | Performed by: PHYSICIAN ASSISTANT

## 2024-09-25 DIAGNOSIS — E06.3 HASHIMOTO'S THYROIDITIS: ICD-10-CM

## 2024-09-26 RX ORDER — LIOTHYRONINE SODIUM 5 UG/1
5 TABLET ORAL DAILY
Qty: 90 TABLET | Refills: 1 | Status: SHIPPED | OUTPATIENT
Start: 2024-09-26

## 2024-09-30 ENCOUNTER — HOSPITAL ENCOUNTER (OUTPATIENT)
Dept: RADIOLOGY | Facility: HOSPITAL | Age: 49
Discharge: HOME | End: 2024-09-30
Attending: PHYSICIAN ASSISTANT
Payer: COMMERCIAL

## 2024-09-30 ENCOUNTER — PATIENT MESSAGE (OUTPATIENT)
Dept: LAB | Facility: HOSPITAL | Age: 49
End: 2024-09-30
Payer: COMMERCIAL

## 2024-09-30 DIAGNOSIS — Z12.31 ENCOUNTER FOR SCREENING MAMMOGRAM FOR MALIGNANT NEOPLASM OF BREAST: ICD-10-CM

## 2024-09-30 PROCEDURE — 77063 BREAST TOMOSYNTHESIS BI: CPT | Mod: 26,,, | Performed by: RADIOLOGY

## 2024-09-30 PROCEDURE — 77067 SCR MAMMO BI INCL CAD: CPT | Mod: 26,,, | Performed by: RADIOLOGY

## 2024-09-30 PROCEDURE — 77067 SCR MAMMO BI INCL CAD: CPT | Mod: TC,PN

## 2025-01-18 DIAGNOSIS — E06.3 HASHIMOTO'S THYROIDITIS: ICD-10-CM

## 2025-01-19 RX ORDER — LEVOTHYROXINE SODIUM 100 UG/1
100 TABLET ORAL
Qty: 90 TABLET | Refills: 1 | Status: SHIPPED | OUTPATIENT
Start: 2025-01-19

## 2025-02-27 ENCOUNTER — OFFICE VISIT (OUTPATIENT)
Dept: OPHTHALMOLOGY | Facility: CLINIC | Age: 50
End: 2025-02-27
Payer: COMMERCIAL

## 2025-02-27 DIAGNOSIS — H52.223 REGULAR ASTIGMATISM OF BOTH EYES WITH PRESBYOPIA: ICD-10-CM

## 2025-02-27 DIAGNOSIS — H25.13 NUCLEAR SCLEROSIS OF BOTH EYES: Primary | ICD-10-CM

## 2025-02-27 DIAGNOSIS — H52.4 REGULAR ASTIGMATISM OF BOTH EYES WITH PRESBYOPIA: ICD-10-CM

## 2025-02-27 PROCEDURE — 92004 COMPRE OPH EXAM NEW PT 1/>: CPT | Mod: S$GLB,,, | Performed by: OPTOMETRIST

## 2025-02-27 PROCEDURE — 99999 PR PBB SHADOW E&M-EST. PATIENT-LVL II: CPT | Mod: PBBFAC,,, | Performed by: OPTOMETRIST

## 2025-02-27 PROCEDURE — 92015 DETERMINE REFRACTIVE STATE: CPT | Mod: S$GLB,,, | Performed by: OPTOMETRIST

## 2025-02-27 NOTE — PROGRESS NOTES
HPI    NP to DKT here for annual eye exam  Last exam a long time ago   Noticed she rely more on OTC readers for small print   Distance is blurry   Has floaters when reading for a long period of time on her phone   Denies flashes of light   Last edited by Jb Reyes on 2/27/2025  8:14 AM.            Assessment /Plan     For exam results, see Encounter Report.    Nuclear sclerosis of both eyes  Cataracts are not visually significant and not affecting activities of daily living. Annual observation is recommended at this time. Patient to call or return to clinic with any significant change in vision prior to next visit.    Regular astigmatism of both eyes with presbyopia  Eyeglass Final Rx       Eyeglass Final Rx         Sphere Cylinder Axis Add    Right +0.50 +0.25 019 +2.00    Left +0.50 +0.50 171 +2.00      Type: PAL    Expiration Date: 2/27/2026   PD 60                     RTC 1 yr for dilated eye exam or sooner if any changes to vision.   Discussed above and answered questions.

## 2025-03-25 ENCOUNTER — TELEPHONE (OUTPATIENT)
Dept: PRIMARY CARE CLINIC | Facility: CLINIC | Age: 50
End: 2025-03-25
Payer: COMMERCIAL

## 2025-03-25 NOTE — TELEPHONE ENCOUNTER
----- Message from Naz sent at 3/25/2025 11:20 AM CDT -----  Contact: Patient, [phone]  Requesting an RX refill or new RX.Is this a refill or new RX: RefillRX name and strength (copy/paste from chart):  liothyronine (CYTOMEL) 5 MCG TabIs this a 30 day or 90 day RX: 90Pharmacy name and phone # (copy/paste from chart):  [id number] [place] - [address] AT [address] [address]n: [phone] Fax: [phone] doctors have asked that we provide their patients with the following 2 reminders -- prescription refills can take up to 72 hours, and a friendly reminder that in the future you can use your [facility] account to request refills: YesRequesting an RX refill or new RX.Is this a refill or new RX: RefillRX name and strength (copy/paste from chart):  ALPRAZolam (XANAX) 0.25 MG tabletIs this a 30 day or 90 day RX: 90Pharmacy name and phone # (copy/paste from chart):  [id number] [place] - [address] AT [address] [address]: [phone] Fax: [phone] doctors have asked that we provide their patients with the following 2 reminders -- prescription refills can take up to 72 hours, and a friendly reminder that in the future you can use your [facility] account to request refills: Yes

## 2025-03-31 DIAGNOSIS — E06.3 HASHIMOTO'S THYROIDITIS: ICD-10-CM

## 2025-03-31 DIAGNOSIS — F41.9 ANXIETY: ICD-10-CM

## 2025-03-31 NOTE — TELEPHONE ENCOUNTER
Refill Routing Note   Medication(s) are not appropriate for processing by [facility] for the following reason(s):        Patient not seen by provider within 15 months  No active prescription written by provider  Outside of protocol    [facility] action(s):  Defer  Route      Medication Therapy Plan: the provider responsible for managing the medication isnt the PCP      Appointments  past 12m or future 3m with PCP    Date Provider   Last Visit   Visit date not found Daniel Call MD   Next Visit   Visit date not found Daniel Call MD   ED visits in past 90 days: 0        Note composed:4:13 PM 03/31/2025

## 2025-04-01 RX ORDER — LIOTHYRONINE SODIUM 5 UG/1
5 TABLET ORAL DAILY
Qty: 90 TABLET | Refills: 1 | Status: SHIPPED | OUTPATIENT
Start: 2025-04-01

## 2025-04-01 RX ORDER — ALPRAZOLAM 0.25 MG/1
0.25 TABLET ORAL 2 TIMES DAILY PRN
Qty: 30 TABLET | Refills: 1 | Status: SHIPPED | OUTPATIENT
Start: 2025-04-01

## 2025-04-15 ENCOUNTER — PATIENT MESSAGE (OUTPATIENT)
Dept: PRIMARY CARE CLINIC | Facility: CLINIC | Age: 50
End: 2025-04-15
Payer: COMMERCIAL

## 2025-04-15 DIAGNOSIS — Z00.00 ANNUAL PHYSICAL EXAM: ICD-10-CM

## 2025-04-15 DIAGNOSIS — E78.2 MIXED HYPERLIPIDEMIA: ICD-10-CM

## 2025-04-15 DIAGNOSIS — E06.3 HASHIMOTO'S THYROIDITIS: Primary | ICD-10-CM

## 2025-04-23 ENCOUNTER — LAB VISIT (OUTPATIENT)
Dept: LAB | Facility: HOSPITAL | Age: 50
End: 2025-04-23
Attending: PHYSICIAN ASSISTANT
Payer: COMMERCIAL

## 2025-04-23 DIAGNOSIS — E78.2 MIXED HYPERLIPIDEMIA: ICD-10-CM

## 2025-04-23 DIAGNOSIS — Z00.00 ANNUAL PHYSICAL EXAM: ICD-10-CM

## 2025-04-23 DIAGNOSIS — E06.3 HASHIMOTO'S THYROIDITIS: ICD-10-CM

## 2025-04-23 LAB
ABSOLUTE EOSINOPHIL (OHS): 0.16 K/UL
ABSOLUTE MONOCYTE (OHS): 0.62 K/UL (ref 0.3–1)
ABSOLUTE NEUTROPHIL COUNT (OHS): 5.95 K/UL (ref 1.8–7.7)
ALBUMIN SERPL BCP-MCNC: 3.8 G/DL (ref 3.5–5.2)
ALP SERPL-CCNC: 73 UNIT/L (ref 40–150)
ALT SERPL W/O P-5'-P-CCNC: 16 UNIT/L (ref 10–44)
ANION GAP (OHS): 8 MMOL/L (ref 8–16)
AST SERPL-CCNC: 17 UNIT/L (ref 11–45)
BASOPHILS # BLD AUTO: 0.09 K/UL
BASOPHILS NFR BLD AUTO: 0.8 %
BILIRUB SERPL-MCNC: 0.2 MG/DL (ref 0.1–1)
BUN SERPL-MCNC: 11 MG/DL (ref 6–20)
CALCIUM SERPL-MCNC: 9.4 MG/DL (ref 8.7–10.5)
CHLORIDE SERPL-SCNC: 103 MMOL/L (ref 95–110)
CHOLEST SERPL-MCNC: 287 MG/DL (ref 120–199)
CHOLEST/HDLC SERPL: 5.1 {RATIO} (ref 2–5)
CO2 SERPL-SCNC: 26 MMOL/L (ref 23–29)
CREAT SERPL-MCNC: 0.7 MG/DL (ref 0.5–1.4)
ERYTHROCYTE [DISTWIDTH] IN BLOOD BY AUTOMATED COUNT: 12.6 % (ref 11.5–14.5)
GFR SERPLBLD CREATININE-BSD FMLA CKD-EPI: >60 ML/MIN/1.73/M2
GLUCOSE SERPL-MCNC: 88 MG/DL (ref 70–110)
HCT VFR BLD AUTO: 42.7 % (ref 37–48.5)
HDLC SERPL-MCNC: 56 MG/DL (ref 40–75)
HDLC SERPL: 19.5 % (ref 20–50)
HGB BLD-MCNC: 14 GM/DL (ref 12–16)
IMM GRANULOCYTES # BLD AUTO: 0.04 K/UL (ref 0–0.04)
IMM GRANULOCYTES NFR BLD AUTO: 0.4 % (ref 0–0.5)
LDLC SERPL CALC-MCNC: 195.8 MG/DL (ref 63–159)
LYMPHOCYTES # BLD AUTO: 4.21 K/UL (ref 1–4.8)
MCH RBC QN AUTO: 32.6 PG (ref 27–31)
MCHC RBC AUTO-ENTMCNC: 32.8 G/DL (ref 32–36)
MCV RBC AUTO: 99 FL (ref 82–98)
NONHDLC SERPL-MCNC: 231 MG/DL
NUCLEATED RBC (/100WBC) (OHS): 0 /100 WBC
PLATELET # BLD AUTO: 409 K/UL (ref 150–450)
PMV BLD AUTO: 9.4 FL (ref 9.2–12.9)
POTASSIUM SERPL-SCNC: 4.8 MMOL/L (ref 3.5–5.1)
PROT SERPL-MCNC: 7.6 GM/DL (ref 6–8.4)
RBC # BLD AUTO: 4.3 M/UL (ref 4–5.4)
RELATIVE EOSINOPHIL (OHS): 1.4 %
RELATIVE LYMPHOCYTE (OHS): 38 % (ref 18–48)
RELATIVE MONOCYTE (OHS): 5.6 % (ref 4–15)
RELATIVE NEUTROPHIL (OHS): 53.8 % (ref 38–73)
SODIUM SERPL-SCNC: 137 MMOL/L (ref 136–145)
T4 FREE SERPL-MCNC: 1.15 NG/DL (ref 0.71–1.51)
TRIGL SERPL-MCNC: 176 MG/DL (ref 30–150)
TSH SERPL-ACNC: 0.06 UIU/ML (ref 0.4–4)
WBC # BLD AUTO: 11.07 K/UL (ref 3.9–12.7)

## 2025-04-23 PROCEDURE — 84439 ASSAY OF FREE THYROXINE: CPT

## 2025-04-23 PROCEDURE — 82040 ASSAY OF SERUM ALBUMIN: CPT

## 2025-04-23 PROCEDURE — 84443 ASSAY THYROID STIM HORMONE: CPT

## 2025-04-23 PROCEDURE — 36415 COLL VENOUS BLD VENIPUNCTURE: CPT | Mod: PN

## 2025-04-23 PROCEDURE — 80061 LIPID PANEL: CPT

## 2025-04-23 PROCEDURE — 85025 COMPLETE CBC W/AUTO DIFF WBC: CPT

## 2025-04-29 ENCOUNTER — OFFICE VISIT (OUTPATIENT)
Dept: PRIMARY CARE CLINIC | Facility: CLINIC | Age: 50
End: 2025-04-29
Payer: COMMERCIAL

## 2025-04-29 VITALS
RESPIRATION RATE: 16 BRPM | HEART RATE: 90 BPM | TEMPERATURE: 97 F | OXYGEN SATURATION: 97 % | WEIGHT: 130.75 LBS | DIASTOLIC BLOOD PRESSURE: 70 MMHG | SYSTOLIC BLOOD PRESSURE: 122 MMHG | BODY MASS INDEX: 24.69 KG/M2 | HEIGHT: 61 IN

## 2025-04-29 DIAGNOSIS — E06.3 HASHIMOTO'S THYROIDITIS: Primary | ICD-10-CM

## 2025-04-29 DIAGNOSIS — C43.9 MALIGNANT MELANOMA, UNSPECIFIED SITE: ICD-10-CM

## 2025-04-29 DIAGNOSIS — E78.2 MIXED HYPERLIPIDEMIA: ICD-10-CM

## 2025-04-29 PROCEDURE — 99999 PR PBB SHADOW E&M-EST. PATIENT-LVL IV: CPT | Mod: PBBFAC,,, | Performed by: PHYSICIAN ASSISTANT

## 2025-04-29 NOTE — PROGRESS NOTES
"    [facility]       [address] [address]      Phone: [phone]      Fax: [phone]    Maggy Horn PA-C                Office Visit  04/29/2025        Subjective      HPI:  Anabella Temple is a 49 y.o. female presents today in clinic for "Follow-up  ."     CHIEF COMPLAINT:  Patient presents to discuss recent thyroid lab results and symptoms of fatigue, joint pain, and menopausal symptoms.    HPI:  Patient reports feeling tired and having joint aches, particularly in the last month. She describes severe elbow pain without specific injury. 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 She also mentions night sweats and heat, which she attributes to possible menopause symptoms. Patient had a hysterectomy in the past and believes her ovaries have stopped functioning. She reports having hot flashes for a while, which subsided, but have returned in the last 1-1.5 months with increased severity. Her  is uncomfortable with how cold she keeps their house. Patient mentions getting gel shots in her non-operative knee due to severe pain, stating she is overdue for a treatment but plans to wait until June, about 4-5 weeks before a trip to [place] in July. Patient also reports persistent fatigue, stating she naps every day. When asked about sleep apnea symptoms, patient is unsure if she stops breathing during sleep but mentions that her  has told her she snores.    Patient denies feeling hyperthyroid symptoms despite low TSH levels. History of Hashimoto's thyroiditis - currently on Synthroid 100 mcg and Cytomel 5 mcg.     Last year, she was found to have malignant melanoma of her right upper arm.  Follows with Dr. Constantino for ongoing monitoring.      Regarding hyperlipidemia - likely has familial hyperlipidemia.  Labs from 09/2023 - lipoprotein a was high at 185.  LDL is 201.  It was " recommended that patient restart high dose statin.  Patient states that she has not resumed taking the statin.  States that it caused her to have severe myalgias and she had been prescribed cholesterol medication for many years.  She does not want to resume taking any medications for cholesterol at this time.  Patient understands the increased risk of cardiovascular disease.  She also has a history of long-term smoking.   Denies any known family history of premature ASCVD.  Patient has never had any cardiac screening and politely declines any screening at this time.    Anxiety - take Xanax as needed for anxiety/panic attacks.                The following were updated and reviewed by myself in the chart: medications, past medical history, past surgical history, family history, social history, and allergies.     Medications:  Medications Ordered Prior to Encounter[1]     PMHx:  Past Medical History:   Diagnosis Date    Allergy     Anxiety     GERD (gastroesophageal reflux disease)     Pandora's disease     Hashimoto's thyroiditis     Hyperlipidemia     PVC (premature ventricular contraction)       Patient Active Problem List    Diagnosis Date Noted    Hashimoto's thyroiditis     Hyperlipidemia     GERD (gastroesophageal reflux disease)     Anxiety         PSHx:  Past Surgical History:   Procedure Laterality Date    ARTHROSCOPY,KNEE,WITH MENISCUS REPAIR Right 2023    BREAST BIOPSY Left     benign     SECTION      COSMETIC SURGERY      HYSTERECTOMY      JOINT REPLACEMENT  2023    KNEE ARTHROSCOPY W/ PARTIAL MEDIAL MENISCECTOMY Right 10/2023    SKIN CANCER EXCISION Right     arm        FHx:  Family History   Problem Relation Name Age of Onset    Breast cancer Mother Mother     Cancer Mother Mother         Metastatic breast cancer    Depression Mother Mother     Hyperlipidemia Mother Mother     Hypertension Mother Mother     Cancer Father Father         Bile duct cancer    Hearing loss Father  "Father     Hyperlipidemia Father Father     Diabetes Sister Sister     Miscarriages / Stillbirths Sister Sister     Alcohol abuse Brother Brother         Social:  Social History     Socioeconomic History    Marital status:      Spouse name: Rey    Number of children: 2   Tobacco Use    Smoking status: Every Day     Current packs/day: 0.50     Average packs/day: 0.5 packs/day for 34.3 years (17.2 ttl pk-yrs)     Types: Cigarettes     Start date: 1/1/1991     Passive exposure: Past   Substance and Sexual Activity    Alcohol use: Never     Comment: occasionally    Drug use: No    Sexual activity: Yes     Partners: Male     Birth control/protection: Post-menopausal        Allergies:  Review of patient's allergies indicates:  No Known Allergies     ROS:  Review of Systems   Constitutional:  Positive for diaphoresis and fatigue. Negative for activity change, appetite change and unexpected weight change.   HENT:  Negative for trouble swallowing and voice change.    Eyes:  Negative for visual disturbance.   Respiratory:  Negative for shortness of breath.    Cardiovascular:  Negative for chest pain.   Gastrointestinal:  Negative for abdominal pain, constipation and diarrhea.   Endocrine: Negative for polydipsia, polyphagia and polyuria.   Genitourinary:  Negative for decreased urine volume and difficulty urinating.   Musculoskeletal:  Positive for arthralgias. Negative for myalgias.   Skin:  Negative for rash.   Neurological:  Negative for dizziness, light-headedness and headaches.   Psychiatric/Behavioral:  Negative for dysphoric mood. The patient is not nervous/anxious.           Objective      /70 (BP Location: Right arm, Patient Position: Sitting)   Pulse 90   Temp 97.3 °F (36.3 °C) (Tympanic)   Resp 16   Ht 5' 1" (1.549 m)   Wt 59.3 kg (130 lb 11.7 oz)   SpO2 97%   BMI 24.70 kg/m²   Ht Readings from Last 3 Encounters:   04/29/25 5' 1" (1.549 m)   09/23/24 5' 1" (1.549 m)   07/24/24 5' 1" (1.549 m) "     Wt Readings from Last 3 Encounters:   04/29/25 59.3 kg (130 lb 11.7 oz)   09/23/24 59 kg (130 lb 1.1 oz)   07/24/24 58.3 kg (128 lb 8.5 oz)       PHYSICAL EXAM:  Physical Exam  Vitals and nursing note reviewed.   Constitutional:       General: She is not in acute distress.     Appearance: Normal appearance. She is not ill-appearing.   HENT:      Head: Normocephalic and atraumatic.      Nose: Nose normal.      Mouth/Throat:      Mouth: Mucous membranes are moist.      Pharynx: Oropharynx is clear.   Eyes:      Extraocular Movements: Extraocular movements intact.      Pupils: Pupils are equal, round, and reactive to light.   Cardiovascular:      Rate and Rhythm: Normal rate and regular rhythm.      Pulses: Normal pulses.      Heart sounds: Normal heart sounds.   Pulmonary:      Effort: Pulmonary effort is normal. No respiratory distress.      Breath sounds: Normal breath sounds.   Abdominal:      General: Abdomen is flat. Bowel sounds are normal.      Tenderness: There is no abdominal tenderness.   Musculoskeletal:         General: No deformity or signs of injury. Normal range of motion.      Cervical back: Normal range of motion.   Skin:     General: Skin is warm and dry.      Findings: No rash.   Neurological:      General: No focal deficit present.      Mental Status: She is alert.   Psychiatric:         Mood and Affect: Mood normal.            Assessment    1. Hashimoto's thyroiditis    2. Mixed hyperlipidemia    3. Malignant melanoma, unspecified site          Plan    Anabella was seen today for follow-up.    Diagnoses and all orders for this visit:    Hashimoto's thyroiditis  -     Ambulatory referral/consult to Endocrinology; Future    Mixed hyperlipidemia    Malignant melanoma, unspecified site    IMPRESSION:  - TSH low, free T4 normal, suggesting subclinical hyperthyroidism.  - Symptoms (fatigue, joint aches) inconsistent with hyperthyroidism. Seem more hypothyroid   - Decided against thyroid medication  changes   - Evaluated menopausal symptoms and considered non-hormonal management options.  - Discussed persistent elevated cholesterol levels and potential need for further cardiac risk assessment.  - Considered sleep apnea as possible cause of fatigue.    THYROID ISSUES (AUTOIMMUNE THYROIDITIS AND SUBCLINICAL HYPERTHYROIDISM):  - Continue levothyroxine 100 mcg daily and Cytomel 5 mcg daily.  - Laboratory results show suppressed TSH with normal free T4  - Considering referral to endocrinology for further assessment of thyroid condition.    JOINT PAIN:  - Patient reports feeling tired with joint aches and inflammation, including severe elbow pain.  - Acknowledged symptoms could be due to arthritis.  - Patient is receiving gel injections in non-operative knee due to severe pain and plans to get another gel injection in June before her trip to [place] in July.    MENOPAUSAL SYMPTOMS:  - Patient experiences night sweats and hot flashes.  - She had a hysterectomy and believes her ovaries have stopped functioning.  - Recommend trying evening primrose oil supplement for symptom management.  - Suggested referral to endocrinology for further management of menopausal symptoms.    HISTORY OF SKIN CANCER:  - Patient has a history of skin cancer and reports regular full-body skin exams with good results.      FOLLOW-UP:  Follow up in about 6 months (around 10/29/2025) for With Dr. Call.    I spent a total of 30 minutes face to face and non-face to face on the date of this visit.This includes time preparing to see the patient (eg, review of tests, notes), obtaining and/or reviewing additional history from an independent historian and/or outside medical records, documenting clinical information in the electronic health record, independently interpreting results and/or communicating results to the patient/family/caregiver, or care coordinator.  Visit today included increased complexity associated with the care of the episodic  problem addressed and managing the longitudinal care of the patient due to the serious and/or complex managed problem(s).      Signed by:        Maggy Horn PA-C      This note was generated with the assistance of ambient listening technology. Verbal consent was obtained by the patient and accompanying visitor(s) for the recording of patient appointment to facilitate this note. I attest to having reviewed and edited the generated note for accuracy, though some syntax or spelling errors may persist. Please contact the author of this note for any clarification.         [1]   Current Outpatient Medications on File Prior to Visit   Medication Sig Dispense Refill    ALPRAZolam (XANAX) 0.25 MG tablet Take 1 tablet (0.25 mg total) by mouth 2 (two) times daily as needed for Anxiety. 30 tablet 1    levothyroxine (SYNTHROID) 100 MCG tablet TAKE 1 TABLET BY MOUTH EVERY DAY 90 tablet 1    liothyronine (CYTOMEL) 5 MCG Tab Take 1 tablet (5 mcg total) by mouth once daily. 90 tablet 1    minoxidiL (LONITEN) 2.5 MG tablet Take 1.25 mg by mouth every evening.      tretinoin (RETIN-A) 0.1 % cream SMARTSIG:Topical 3 Times a Week PRN       No current facility-administered medications on file prior to visit.

## 2025-05-29 PROBLEM — T46.6X5A MYALGIA DUE TO STATIN: Status: ACTIVE | Noted: 2025-05-29

## 2025-05-29 PROBLEM — M79.10 MYALGIA DUE TO STATIN: Status: ACTIVE | Noted: 2025-05-29

## 2025-06-20 DIAGNOSIS — E06.3 HASHIMOTO'S THYROIDITIS: ICD-10-CM

## 2025-06-20 RX ORDER — LEVOTHYROXINE SODIUM 100 UG/1
100 TABLET ORAL
Qty: 90 TABLET | Refills: 3 | Status: SHIPPED | OUTPATIENT
Start: 2025-06-20